# Patient Record
(demographics unavailable — no encounter records)

---

## 2024-10-22 NOTE — PROCEDURE
[de-identified] : Stroboscopic Laryngoscopy Procedure Note: Indications: Assess laryngeal biomechanics and vocal fold oscillation. Description of Procedure: Informed consent was verbally obtained from the patient prior to the procedure. The patient was seated in the clinic chair. Topical anesthesia was achieved by first spraying the nasal cavities with 4% lidocaine and nasal decongestant. Findings: Supraglottis: no masses or lesions Glottis:  Vocal cords:                       Right: crisp and shows no lesions or masses                       Left:  crisp and shows no lesions or masses                Mobility:                       Right:  normal                       Left:  normal                Amplitude:                       Right:  normal                       Left:  normal                Closure: complete                Wave symmetry:  symmetric Subglottis: no masses or lesions within the visualized subglottis. Visualized airway is widely patent. 1-2x/week
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
## 2024-10-22 NOTE — PROCEDURE
[de-identified] : Stroboscopic Laryngoscopy Procedure Note: Indications: Assess laryngeal biomechanics and vocal fold oscillation. Description of Procedure: Informed consent was verbally obtained from the patient prior to the procedure. The patient was seated in the clinic chair. Topical anesthesia was achieved by first spraying the nasal cavities with 4% lidocaine and nasal decongestant. Findings: Supraglottis: no masses or lesions Glottis:  Vocal cords:                       Right: crisp and shows no lesions or masses                       Left:  crisp and shows no lesions or masses                Mobility:                       Right:  normal                       Left:  normal                Amplitude:                       Right:  normal                       Left:  normal                Closure: complete                Wave symmetry:  symmetric Subglottis: no masses or lesions within the visualized subglottis. Visualized airway is widely patent.